# Patient Record
Sex: FEMALE | Race: BLACK OR AFRICAN AMERICAN | NOT HISPANIC OR LATINO | URBAN - METROPOLITAN AREA
[De-identification: names, ages, dates, MRNs, and addresses within clinical notes are randomized per-mention and may not be internally consistent; named-entity substitution may affect disease eponyms.]

---

## 2019-11-17 ENCOUNTER — HOSPITAL ENCOUNTER (OUTPATIENT)
Facility: HOSPITAL | Age: 24
Discharge: HOME | End: 2019-11-17
Attending: EMERGENCY MEDICINE | Admitting: INTERNAL MEDICINE
Payer: COMMERCIAL

## 2019-11-17 VITALS
OXYGEN SATURATION: 99 % | SYSTOLIC BLOOD PRESSURE: 114 MMHG | TEMPERATURE: 99 F | DIASTOLIC BLOOD PRESSURE: 60 MMHG | RESPIRATION RATE: 20 BRPM | HEART RATE: 100 BPM

## 2019-11-17 DIAGNOSIS — F10.929 ALCOHOL INTOXICATION: ICD-10-CM

## 2019-11-17 DIAGNOSIS — J45.909 ASTHMA, UNSPECIFIED ASTHMA SEVERITY, UNSPECIFIED WHETHER COMPLICATED, UNSPECIFIED WHETHER PERSISTENT: ICD-10-CM

## 2019-11-17 DIAGNOSIS — D64.9 NORMOCYTIC ANEMIA: ICD-10-CM

## 2019-11-17 DIAGNOSIS — E87.4 ACIDOSIS, METABOLIC, WITH RESPIRATORY ACIDOSIS: ICD-10-CM

## 2019-11-17 DIAGNOSIS — D50.9 IRON DEFICIENCY ANEMIA, UNSPECIFIED IRON DEFICIENCY ANEMIA TYPE: ICD-10-CM

## 2019-11-17 DIAGNOSIS — F10.920 ALCOHOLIC INTOXICATION WITHOUT COMPLICATION: ICD-10-CM

## 2019-11-17 DIAGNOSIS — R94.31 ST ELEVATION: ICD-10-CM

## 2019-11-17 DIAGNOSIS — E87.6 HYPOKALEMIA: Primary | ICD-10-CM

## 2019-11-17 PROBLEM — E87.29 RESPIRATORY ACIDOSIS: Status: ACTIVE | Noted: 2019-11-17

## 2019-11-17 LAB
ALBUMIN SERPL BCP-MCNC: 3.4 G/DL (ref 3.5–5.2)
ALLENS TEST: ABNORMAL
ALP SERPL-CCNC: 63 U/L (ref 55–135)
ALT SERPL W/O P-5'-P-CCNC: 11 U/L (ref 10–44)
AMPHET+METHAMPHET UR QL: NEGATIVE
ANION GAP SERPL CALC-SCNC: 14 MMOL/L (ref 8–16)
ANION GAP SERPL CALC-SCNC: 9 MMOL/L (ref 8–16)
AST SERPL-CCNC: 17 U/L (ref 10–40)
B-HCG UR QL: NEGATIVE
BARBITURATES UR QL SCN>200 NG/ML: NEGATIVE
BASOPHILS # BLD AUTO: 0.02 K/UL (ref 0–0.2)
BASOPHILS # BLD AUTO: 0.1 K/UL (ref 0–0.2)
BASOPHILS NFR BLD: 0.2 % (ref 0–1.9)
BASOPHILS NFR BLD: 0.8 % (ref 0–1.9)
BENZODIAZ UR QL SCN>200 NG/ML: NEGATIVE
BILIRUB SERPL-MCNC: 0.2 MG/DL (ref 0.1–1)
BILIRUB UR QL STRIP: NEGATIVE
BUN SERPL-MCNC: 3 MG/DL (ref 6–20)
BUN SERPL-MCNC: 5 MG/DL (ref 6–20)
BZE UR QL SCN: NEGATIVE
CALCIUM SERPL-MCNC: 8.3 MG/DL (ref 8.7–10.5)
CALCIUM SERPL-MCNC: 8.4 MG/DL (ref 8.7–10.5)
CANNABINOIDS UR QL SCN: NORMAL
CHLORIDE SERPL-SCNC: 109 MMOL/L (ref 95–110)
CHLORIDE SERPL-SCNC: 110 MMOL/L (ref 95–110)
CLARITY UR REFRACT.AUTO: CLEAR
CO2 SERPL-SCNC: 18 MMOL/L (ref 23–29)
CO2 SERPL-SCNC: 22 MMOL/L (ref 23–29)
COLOR UR AUTO: YELLOW
CREAT SERPL-MCNC: 0.6 MG/DL (ref 0.5–1.4)
CREAT SERPL-MCNC: 0.7 MG/DL (ref 0.5–1.4)
CREAT UR-MCNC: 126 MG/DL (ref 15–325)
CTP QC/QA: YES
DIFFERENTIAL METHOD: ABNORMAL
DIFFERENTIAL METHOD: ABNORMAL
EOSINOPHIL # BLD AUTO: 0 K/UL (ref 0–0.5)
EOSINOPHIL # BLD AUTO: 0.3 K/UL (ref 0–0.5)
EOSINOPHIL NFR BLD: 0 % (ref 0–8)
EOSINOPHIL NFR BLD: 2.3 % (ref 0–8)
ERYTHROCYTE [DISTWIDTH] IN BLOOD BY AUTOMATED COUNT: 16.9 % (ref 11.5–14.5)
ERYTHROCYTE [DISTWIDTH] IN BLOOD BY AUTOMATED COUNT: 17.2 % (ref 11.5–14.5)
EST. GFR  (AFRICAN AMERICAN): >60 ML/MIN/1.73 M^2
EST. GFR  (AFRICAN AMERICAN): >60 ML/MIN/1.73 M^2
EST. GFR  (NON AFRICAN AMERICAN): >60 ML/MIN/1.73 M^2
EST. GFR  (NON AFRICAN AMERICAN): >60 ML/MIN/1.73 M^2
ETHANOL SERPL-MCNC: 216 MG/DL
FERRITIN SERPL-MCNC: 30 NG/ML (ref 20–300)
FOLATE SERPL-MCNC: 4.8 NG/ML (ref 4–24)
GLUCOSE SERPL-MCNC: 108 MG/DL (ref 70–110)
GLUCOSE SERPL-MCNC: 166 MG/DL (ref 70–110)
GLUCOSE UR QL STRIP: NEGATIVE
HCO3 UR-SCNC: 20.7 MMOL/L (ref 24–28)
HCT VFR BLD AUTO: 31.4 % (ref 37–48.5)
HCT VFR BLD AUTO: 33.4 % (ref 37–48.5)
HGB BLD-MCNC: 9.6 G/DL (ref 12–16)
HGB BLD-MCNC: 9.9 G/DL (ref 12–16)
HGB UR QL STRIP: NEGATIVE
IMM GRANULOCYTES # BLD AUTO: 0.06 K/UL (ref 0–0.04)
IMM GRANULOCYTES # BLD AUTO: 0.27 K/UL (ref 0–0.04)
IMM GRANULOCYTES NFR BLD AUTO: 0.7 % (ref 0–0.5)
IMM GRANULOCYTES NFR BLD AUTO: 2.2 % (ref 0–0.5)
IRON SERPL-MCNC: 24 UG/DL (ref 30–160)
KETONES UR QL STRIP: NEGATIVE
LEUKOCYTE ESTERASE UR QL STRIP: NEGATIVE
LYMPHOCYTES # BLD AUTO: 1.2 K/UL (ref 1–4.8)
LYMPHOCYTES # BLD AUTO: 3.7 K/UL (ref 1–4.8)
LYMPHOCYTES NFR BLD: 13.5 % (ref 18–48)
LYMPHOCYTES NFR BLD: 29.6 % (ref 18–48)
MAGNESIUM SERPL-MCNC: 1.7 MG/DL (ref 1.6–2.6)
MCH RBC QN AUTO: 25.4 PG (ref 27–31)
MCH RBC QN AUTO: 25.5 PG (ref 27–31)
MCHC RBC AUTO-ENTMCNC: 29.6 G/DL (ref 32–36)
MCHC RBC AUTO-ENTMCNC: 30.6 G/DL (ref 32–36)
MCV RBC AUTO: 83 FL (ref 82–98)
MCV RBC AUTO: 86 FL (ref 82–98)
METHADONE UR QL SCN>300 NG/ML: NEGATIVE
MONOCYTES # BLD AUTO: 0.4 K/UL (ref 0.3–1)
MONOCYTES # BLD AUTO: 1.4 K/UL (ref 0.3–1)
MONOCYTES NFR BLD: 10.9 % (ref 4–15)
MONOCYTES NFR BLD: 4.7 % (ref 4–15)
NEUTROPHILS # BLD AUTO: 6.7 K/UL (ref 1.8–7.7)
NEUTROPHILS # BLD AUTO: 7.3 K/UL (ref 1.8–7.7)
NEUTROPHILS NFR BLD: 54.2 % (ref 38–73)
NEUTROPHILS NFR BLD: 80.9 % (ref 38–73)
NITRITE UR QL STRIP: NEGATIVE
NRBC BLD-RTO: 0 /100 WBC
NRBC BLD-RTO: 0 /100 WBC
OPIATES UR QL SCN: NEGATIVE
PCO2 BLDA: 48.1 MMHG (ref 35–45)
PCP UR QL SCN>25 NG/ML: NEGATIVE
PH SMN: 7.24 [PH] (ref 7.35–7.45)
PH UR STRIP: 6 [PH] (ref 5–8)
PHOSPHATE SERPL-MCNC: 3.7 MG/DL (ref 2.7–4.5)
PLATELET # BLD AUTO: 293 K/UL (ref 150–350)
PLATELET # BLD AUTO: 300 K/UL (ref 150–350)
PMV BLD AUTO: 11.5 FL (ref 9.2–12.9)
PMV BLD AUTO: 11.7 FL (ref 9.2–12.9)
PO2 BLDA: 41 MMHG (ref 40–60)
POC BE: -7 MMOL/L
POC SATURATED O2: 68 % (ref 95–100)
POC TCO2: 22 MMOL/L (ref 24–29)
POCT GLUCOSE: 164 MG/DL (ref 70–110)
POTASSIUM SERPL-SCNC: 2.6 MMOL/L (ref 3.5–5.1)
POTASSIUM SERPL-SCNC: 4.4 MMOL/L (ref 3.5–5.1)
PROT SERPL-MCNC: 6.7 G/DL (ref 6–8.4)
PROT UR QL STRIP: NEGATIVE
RBC # BLD AUTO: 3.77 M/UL (ref 4–5.4)
RBC # BLD AUTO: 3.89 M/UL (ref 4–5.4)
RETICS/RBC NFR AUTO: 1.5 % (ref 0.5–2.5)
SAMPLE: ABNORMAL
SATURATED IRON: 7 % (ref 20–50)
SITE: ABNORMAL
SODIUM SERPL-SCNC: 140 MMOL/L (ref 136–145)
SODIUM SERPL-SCNC: 142 MMOL/L (ref 136–145)
SP GR UR STRIP: 1.01 (ref 1–1.03)
TOTAL IRON BINDING CAPACITY: 343 UG/DL (ref 250–450)
TOXICOLOGY INFORMATION: NORMAL
TRANSFERRIN SERPL-MCNC: 232 MG/DL (ref 200–375)
TROPONIN I SERPL DL<=0.01 NG/ML-MCNC: 0.02 NG/ML (ref 0–0.03)
URN SPEC COLLECT METH UR: NORMAL
VIT B12 SERPL-MCNC: 542 PG/ML (ref 210–950)
WBC # BLD AUTO: 12.43 K/UL (ref 3.9–12.7)
WBC # BLD AUTO: 9.07 K/UL (ref 3.9–12.7)

## 2019-11-17 PROCEDURE — 96376 TX/PRO/DX INJ SAME DRUG ADON: CPT

## 2019-11-17 PROCEDURE — 25000003 PHARM REV CODE 250: Performed by: STUDENT IN AN ORGANIZED HEALTH CARE EDUCATION/TRAINING PROGRAM

## 2019-11-17 PROCEDURE — 85025 COMPLETE CBC W/AUTO DIFF WBC: CPT

## 2019-11-17 PROCEDURE — 96361 HYDRATE IV INFUSION ADD-ON: CPT

## 2019-11-17 PROCEDURE — 96366 THER/PROPH/DIAG IV INF ADDON: CPT

## 2019-11-17 PROCEDURE — 82607 VITAMIN B-12: CPT

## 2019-11-17 PROCEDURE — 96375 TX/PRO/DX INJ NEW DRUG ADDON: CPT

## 2019-11-17 PROCEDURE — 84100 ASSAY OF PHOSPHORUS: CPT

## 2019-11-17 PROCEDURE — 99285 EMERGENCY DEPT VISIT HI MDM: CPT | Mod: 25

## 2019-11-17 PROCEDURE — 99220 PR INITIAL OBSERVATION CARE,LEVL III: CPT | Mod: ,,, | Performed by: INTERNAL MEDICINE

## 2019-11-17 PROCEDURE — 82803 BLOOD GASES ANY COMBINATION: CPT

## 2019-11-17 PROCEDURE — 93010 ELECTROCARDIOGRAM REPORT: CPT | Mod: 76,,, | Performed by: INTERNAL MEDICINE

## 2019-11-17 PROCEDURE — 93010 EKG 12-LEAD: ICD-10-PCS | Mod: 76,,, | Performed by: INTERNAL MEDICINE

## 2019-11-17 PROCEDURE — 84484 ASSAY OF TROPONIN QUANT: CPT

## 2019-11-17 PROCEDURE — 80307 DRUG TEST PRSMV CHEM ANLYZR: CPT

## 2019-11-17 PROCEDURE — 83735 ASSAY OF MAGNESIUM: CPT

## 2019-11-17 PROCEDURE — 85045 AUTOMATED RETICULOCYTE COUNT: CPT

## 2019-11-17 PROCEDURE — 36415 COLL VENOUS BLD VENIPUNCTURE: CPT

## 2019-11-17 PROCEDURE — 81025 URINE PREGNANCY TEST: CPT | Performed by: EMERGENCY MEDICINE

## 2019-11-17 PROCEDURE — 83540 ASSAY OF IRON: CPT

## 2019-11-17 PROCEDURE — 99900035 HC TECH TIME PER 15 MIN (STAT)

## 2019-11-17 PROCEDURE — 63600175 PHARM REV CODE 636 W HCPCS: Performed by: EMERGENCY MEDICINE

## 2019-11-17 PROCEDURE — 82800 BLOOD PH: CPT

## 2019-11-17 PROCEDURE — 80320 DRUG SCREEN QUANTALCOHOLS: CPT

## 2019-11-17 PROCEDURE — 80053 COMPREHEN METABOLIC PANEL: CPT

## 2019-11-17 PROCEDURE — 93010 ELECTROCARDIOGRAM REPORT: CPT | Mod: ,,, | Performed by: INTERNAL MEDICINE

## 2019-11-17 PROCEDURE — P9612 CATHETERIZE FOR URINE SPEC: HCPCS

## 2019-11-17 PROCEDURE — 80048 BASIC METABOLIC PNL TOTAL CA: CPT

## 2019-11-17 PROCEDURE — 63600175 PHARM REV CODE 636 W HCPCS: Performed by: STUDENT IN AN ORGANIZED HEALTH CARE EDUCATION/TRAINING PROGRAM

## 2019-11-17 PROCEDURE — 82728 ASSAY OF FERRITIN: CPT

## 2019-11-17 PROCEDURE — G0378 HOSPITAL OBSERVATION PER HR: HCPCS

## 2019-11-17 PROCEDURE — 99220 PR INITIAL OBSERVATION CARE,LEVL III: ICD-10-PCS | Mod: ,,, | Performed by: INTERNAL MEDICINE

## 2019-11-17 PROCEDURE — 93005 ELECTROCARDIOGRAM TRACING: CPT

## 2019-11-17 PROCEDURE — 82962 GLUCOSE BLOOD TEST: CPT

## 2019-11-17 PROCEDURE — 63600175 PHARM REV CODE 636 W HCPCS

## 2019-11-17 PROCEDURE — 81003 URINALYSIS AUTO W/O SCOPE: CPT | Mod: 59

## 2019-11-17 PROCEDURE — 82746 ASSAY OF FOLIC ACID SERUM: CPT

## 2019-11-17 PROCEDURE — 96365 THER/PROPH/DIAG IV INF INIT: CPT

## 2019-11-17 PROCEDURE — 99285 PR EMERGENCY DEPT VISIT,LEVEL V: ICD-10-PCS | Mod: ,,, | Performed by: EMERGENCY MEDICINE

## 2019-11-17 PROCEDURE — 99285 EMERGENCY DEPT VISIT HI MDM: CPT | Mod: ,,, | Performed by: EMERGENCY MEDICINE

## 2019-11-17 RX ORDER — ONDANSETRON 2 MG/ML
4 INJECTION INTRAMUSCULAR; INTRAVENOUS
Status: COMPLETED | OUTPATIENT
Start: 2019-11-17 | End: 2019-11-17

## 2019-11-17 RX ORDER — SODIUM CHLORIDE 0.9 % (FLUSH) 0.9 %
10 SYRINGE (ML) INJECTION
Status: DISCONTINUED | OUTPATIENT
Start: 2019-11-17 | End: 2019-11-17 | Stop reason: HOSPADM

## 2019-11-17 RX ORDER — POTASSIUM CHLORIDE 7.45 MG/ML
10 INJECTION INTRAVENOUS
Status: DISPENSED | OUTPATIENT
Start: 2019-11-17 | End: 2019-11-17

## 2019-11-17 RX ORDER — GLUCAGON 1 MG
1 KIT INJECTION
Status: DISCONTINUED | OUTPATIENT
Start: 2019-11-17 | End: 2019-11-17 | Stop reason: HOSPADM

## 2019-11-17 RX ORDER — IBUPROFEN 200 MG
24 TABLET ORAL
Status: DISCONTINUED | OUTPATIENT
Start: 2019-11-17 | End: 2019-11-17 | Stop reason: HOSPADM

## 2019-11-17 RX ORDER — ALBUTEROL SULFATE 2.5 MG/.5ML
2.5 SOLUTION RESPIRATORY (INHALATION) EVERY 4 HOURS PRN
Status: DISCONTINUED | OUTPATIENT
Start: 2019-11-17 | End: 2019-11-17 | Stop reason: HOSPADM

## 2019-11-17 RX ORDER — IBUPROFEN 200 MG
16 TABLET ORAL
Status: DISCONTINUED | OUTPATIENT
Start: 2019-11-17 | End: 2019-11-17 | Stop reason: HOSPADM

## 2019-11-17 RX ORDER — POTASSIUM CHLORIDE 20 MEQ/1
40 TABLET, EXTENDED RELEASE ORAL ONCE
Status: COMPLETED | OUTPATIENT
Start: 2019-11-17 | End: 2019-11-17

## 2019-11-17 RX ORDER — ONDANSETRON 8 MG/1
8 TABLET, ORALLY DISINTEGRATING ORAL EVERY 8 HOURS PRN
Status: DISCONTINUED | OUTPATIENT
Start: 2019-11-17 | End: 2019-11-17 | Stop reason: HOSPADM

## 2019-11-17 RX ORDER — MAGNESIUM SULFATE HEPTAHYDRATE 40 MG/ML
2 INJECTION, SOLUTION INTRAVENOUS ONCE
Status: COMPLETED | OUTPATIENT
Start: 2019-11-17 | End: 2019-11-17

## 2019-11-17 RX ORDER — ACETAMINOPHEN 325 MG/1
650 TABLET ORAL EVERY 4 HOURS PRN
Status: DISCONTINUED | OUTPATIENT
Start: 2019-11-17 | End: 2019-11-17 | Stop reason: HOSPADM

## 2019-11-17 RX ORDER — NALOXONE HCL 0.4 MG/ML
VIAL (ML) INJECTION
Status: COMPLETED
Start: 2019-11-17 | End: 2019-11-17

## 2019-11-17 RX ORDER — ONDANSETRON 2 MG/ML
INJECTION INTRAMUSCULAR; INTRAVENOUS
Status: COMPLETED
Start: 2019-11-17 | End: 2019-11-17

## 2019-11-17 RX ADMIN — POTASSIUM CHLORIDE 40 MEQ: 1500 TABLET, EXTENDED RELEASE ORAL at 09:11

## 2019-11-17 RX ADMIN — SODIUM CHLORIDE 1000 ML: 0.9 INJECTION, SOLUTION INTRAVENOUS at 12:11

## 2019-11-17 RX ADMIN — ONDANSETRON 4 MG: 2 INJECTION INTRAMUSCULAR; INTRAVENOUS at 12:11

## 2019-11-17 RX ADMIN — POTASSIUM CHLORIDE 10 MEQ: 7.46 INJECTION, SOLUTION INTRAVENOUS at 04:11

## 2019-11-17 RX ADMIN — NALOXONE HYDROCHLORIDE: 0.4 INJECTION, SOLUTION INTRAMUSCULAR; INTRAVENOUS; SUBCUTANEOUS at 12:11

## 2019-11-17 RX ADMIN — POTASSIUM CHLORIDE 10 MEQ: 7.46 INJECTION, SOLUTION INTRAVENOUS at 03:11

## 2019-11-17 RX ADMIN — POTASSIUM CHLORIDE 40 MEQ: 1500 TABLET, EXTENDED RELEASE ORAL at 04:11

## 2019-11-17 RX ADMIN — MAGNESIUM SULFATE IN WATER 2 G: 40 INJECTION, SOLUTION INTRAVENOUS at 04:11

## 2019-11-17 RX ADMIN — POTASSIUM CHLORIDE 10 MEQ: 7.46 INJECTION, SOLUTION INTRAVENOUS at 02:11

## 2019-11-17 RX ADMIN — SODIUM CHLORIDE, SODIUM LACTATE, POTASSIUM CHLORIDE, AND CALCIUM CHLORIDE 1000 ML: .6; .31; .03; .02 INJECTION, SOLUTION INTRAVENOUS at 02:11

## 2019-11-17 RX ADMIN — ONDANSETRON 4 MG: 2 INJECTION INTRAMUSCULAR; INTRAVENOUS at 02:11

## 2019-11-17 NOTE — ED TRIAGE NOTES
Romaine Regan, a 24 y.o. female presents to the ED w/ complaint of syncope episode PTA (11/17/19 0000). Per friends (at bedside) pt was drinking all day, and had a few episodes of vomiting. Friends brought patient outside to catch some air and passed out. Unresponsive at this time. VSS. Pinpoint pupils.     Triage note:  Chief Complaint   Patient presents with    Alcohol Intoxication     someone from event called reporting she may have been slipped something and passed out. +ETOH      Review of patient's allergies indicates:  No Known Allergies  No past medical history on file.

## 2019-11-17 NOTE — CODE/ RAPID DOCUMENTATION
Rapid Response Nurse Chart Check     Chart check completed, abnormal VS noted. Bedside RN contacted, no concerns verbalized at this time, sinus tach, SBP 90, no acute distress noted at this time, pt on tele. Instructed to call [phone] for further concerns or assistance.

## 2019-11-17 NOTE — NURSING
Pt prepared for discharge, AVS reviewed with patient and mother at bedside.  Plan to return home to [place], [place] area today.  VSS, NAD.  Tele monitor removed and returned.  Peripheral IV #20 to LAC removed, dressing applied, no active bleeding noted.

## 2019-11-17 NOTE — NURSING
Patient admitted from ED for alcohol intoxication. ST elevation noted on telemetry. Rapid response nurse notified and in agreement with ST elevation. On call MD notified and STAT EKG ordered and obtained. Primary team aware and will follow-up.

## 2019-11-17 NOTE — ED PROVIDER NOTES
Encounter Date: 11/17/2019       History     Chief Complaint   Patient presents with    Alcohol Intoxication     someone from event called reporting she may have been slipped something and passed out. +ETOH      HPI     24-year-old previously healthy female presenting via EMS for acute alcoholic intoxication.  Per EMS report and friends were present at bedside, patient drank a large amount of alcohol today as they are visiting from [place].  Reportedly drank several drinks throughout the day.  Concerned with possible ingestion or possibly slipped something in her drink.  Patient arrives via EMS, obtunded, acutely intoxicated.  She is able to be aroused, appears intoxicated.  No focal neurologic deficits.  No signs of trauma, fall or injury. Reported, passing out after drinking.  She has emesis on her clothing, with several episodes of vomiting.    Review of patient's allergies indicates:  No Known Allergies  No past medical history on file.  No past surgical history on file.  No family history on file.  Social History     Tobacco Use    Smoking status: Not on file   Substance Use Topics    Alcohol use: Not on file    Drug use: Not on file     Review of Systems   Unable to perform ROS: Acuity of condition       Physical Exam     Initial Vitals [11/17/19 0026]   BP Pulse Resp Temp SpO2   120/86 64 18 -- 98 %      MAP       --         Physical Exam    Nursing note and vitals reviewed.    Gen/Constitutional:  Obtunded, lethargic, acutely intoxicated  Head: Normocephalic, Atraumatic, no lacerations  Neck: supple, no masses or LAD, no JVD  Eyes: PERRLA, conjunctiva clear, pupils 3-2 mm briskly reactive  Ears, Nose and Throat: No rhinorrhea or stridor.  Cardiac:  Regular rate, Reg Rhythm, No murmur, rubs or gallops  Pulmonary: CTA Bilat, no wheezes, rhonchi, rales.  GI: Abdomen soft, non-tender, non-distended; no rebound or guarding  : No CVA tenderness.  Musculoskeletal: Extremities warm, well perfused, no  erythema, no edema  Skin: No rashes  Neuro:  Obtunded, lethargic, acutely intoxicated  Psych: Normal affect      ED Course   Procedures  Labs Reviewed   CBC W/ AUTO DIFFERENTIAL - Abnormal; Notable for the following components:       Result Value    RBC 3.77 (*)     Hemoglobin 9.6 (*)     Hematocrit 31.4 (*)     Mean Corpuscular Hemoglobin 25.5 (*)     Mean Corpuscular Hemoglobin Conc 30.6 (*)     RDW 16.9 (*)     Immature Granulocytes 2.2 (*)     Immature Grans (Abs) 0.27 (*)     Mono # 1.4 (*)     All other components within normal limits   COMPREHENSIVE METABOLIC PANEL - Abnormal; Notable for the following components:    Potassium 2.6 (*)     CO2 18 (*)     Glucose 166 (*)     BUN, Bld 5 (*)     Calcium 8.3 (*)     Albumin 3.4 (*)     All other components within normal limits   ALCOHOL,MEDICAL (ETHANOL) - Abnormal; Notable for the following components:    Alcohol, Medical, Serum 216 (*)     All other components within normal limits   POCT GLUCOSE - Abnormal; Notable for the following components:    POCT Glucose 164 (*)     All other components within normal limits   ISTAT PROCEDURE - Abnormal; Notable for the following components:    POC PH 7.242 (*)     POC PCO2 48.1 (*)     POC HCO3 20.7 (*)     POC SATURATED O2 68 (*)     POC TCO2 22 (*)     All other components within normal limits   DRUG SCREEN PANEL, URINE EMERGENCY    Narrative:     Preferred Collection Type->Urine, Clean Catch   URINALYSIS, REFLEX TO URINE CULTURE    Narrative:     Preferred Collection Type->Urine, Clean Catch   MAGNESIUM   PHOSPHORUS   POCT URINE PREGNANCY   POCT GLUCOSE MONITORING CONTINUOUS          Imaging Results          X-Ray Chest AP Portable (Final result)  Result time 11/17/19 02:14:27    Final result by Anil Ordoñez MD (11/17/19 02:14:27)                 Impression:      No focal consolidation identified on this limited single view.      Electronically signed by: Anil Ordoñez MD  Date:    11/17/2019  Time:    02:14     "         Narrative:    EXAMINATION:  XR CHEST AP PORTABLE    CLINICAL HISTORY:  Provided history is "alcohol intoxication;  ".    TECHNIQUE:  One view of the chest.    COMPARISON:  None.    FINDINGS:  Cardiac wires and other radiodensities overlie the chest and somewhat limit evaluation of the underlying lung fields.  Cardiac silhouette is not enlarged.  There are mild perihilar interstitial opacities which may be exaggerated by supine technique.  No confluent area of consolidation.  No sizable pleural effusion.  No pneumothorax.                                 Medical Decision Making:   History:   I obtained history from: EMS provider.  Old Medical Records: I decided to obtain old medical records.  Initial Assessment:   24-year-old previously healthy female presenting with acute alcoholic intoxication.  Differential Diagnosis:   Differential diagnosis includes was not limited to:  Alcohol intoxication, alcohol use, hypoglycemia, trauma, drug abuse, overdose, encephalopathy  Independently Interpreted Test(s):   I have ordered and independently interpreted X-rays - see prior notes.  I have ordered and independently interpreted EKG Reading(s) - see prior notes  Clinical Tests:   Lab Tests: Ordered and Reviewed  Radiological Study: Ordered and Reviewed  Medical Tests: Ordered and Reviewed    Emergent evaluation of patient presenting via EMS for acute alcohol intoxication.  She was found down, vomiting, after passing with drinking several alcoholic drinks today.  Her friends were concerned that she may have been slipped something in her drink.  She is currently afebrile, vital signs are stable. No hypoxemia, no hypotension she is protecting her airway.  Physical exam findings unremarkable with any acute traumatic injuries.  She has of emesis and vomiting on her clothes.  No loss of urine, no seizure reported history.  There is no evidence of trauma on the head, neck, back, spine or abdomen.  No bony deformities of her " extremities. Given acute intoxication with obtundation, patient was placed on oxygen, cardiac and telemetry monitoring including pulse oximetry.  IV line was placed, labs were drawn, UA obtained a pregnancy test obtained, ECG obtained with no signs of ischemia or STEMI on my read.  IV fluids administered.  Blood sugar stable without hypoglycemia.  Blood alcohol over 200, labs with significant hypokalemia with potassium of 2.6 venous blood gas elevated pCO2 to 48, pH to 7.2.  Suspect likely respiratory and metabolic acidosis.  Discussed case with hospital medicine team, will admit for observation, given acute intoxication with metabolic derangements.  Patient family agreeable to admission plan.    Complexity:  High - level 5                               Clinical Impression:       ICD-10-CM ICD-9-CM   1. Hypokalemia E87.6 276.8   2. Alcohol intoxication F10.929 305.00   3. Acidosis, metabolic, with respiratory acidosis E87.4 276.4         Disposition:   Disposition: Discharged  Condition: Stable      Toby Dietz DO  Dept of Emergency Medicine   [facility]  Spectralink: [id number]                 Toby Dietz DO  11/17/19 0348

## 2019-11-17 NOTE — LETTER
November 17, 2019         [address]  [facility] [address]  Phone: [phone]       Patient: Romaine Regan   YOB: 1995  Date of Visit: 11/17/2019    To Whom It May Concern:    Sea Regan  was at [facility] on 11/17/2019. She may return to work/school on 11/22/19 with no restrictions. If you have any questions or concerns, or if I can be of further assistance, please do not hesitate to contact me.    Sincerely,    Tahira Gordon MD

## 2019-11-17 NOTE — DISCHARGE SUMMARY
[facility] Hospital Medicine  Discharge Summary      Patient Name: Romaine Regan  MRN: [id number]  Admission Date: 11/17/2019  Hospital Length of Stay: 0 days  Discharge Date and Time:  11/17/2019 2:15 PM  Attending Physician: Elizabeth Montelongo MD   Discharging Provider: Tahira Gordon MD  Primary Care Provider: No primary care provider on file.  Hospital Medicine Team[place] [facility] Tahira Gordon MD    HPI:   Ms. Romaine Regan is a 24 year old female with asthma who was brought to the ED for alcohol intoxication on 11/17 and admitted to hospital medicine for intoxication with electrolyte derangements.     Ms. Regan is a healthy young female who was visiting from [place] with friends for a bachelorette party. She was out drinking with her friends and began to be more intoxicated, vomited, and fell asleep. Her friends then called EMS. She was given IV fluids and IV potassium after her serum potassium was found to be 2.6. A VBG was checked and she had pH of 7.24 with pCO2 of 48. She was not hypoxic or in any respiratory distress.     Ms. Regan reports that she is unsure of how much alcohol she drank last night. She occasionally smokes cigarettes and smokes marijuana. She denies any other drug use. She felt well prior to last night.         * No surgery found *      Hospital Course:   Patient felt better and was more awake the following day of admission. Her electrolytes returned to within normals after being given IVF and electrolyte replacement. She was able to eat without nausea or vomiting. Iron studies showed low iron, likely due to iron deficiency anemia. Encouraged patient to follow up with her PCP in [place] (patient resides there) within a week for hospital discharge follow up and anemia management.      Consults:     No new Assessment & Plan notes have been filed under this hospital service since the last note was generated.  Service: Hospital Medicine    Final Active Diagnoses:    Diagnosis Date  Noted POA    PRINCIPAL PROBLEM:  Alcohol intoxication [F10.929] 11/17/2019 Yes    Asthma [J45.909] 11/17/2019 Yes    Hypokalemia [E87.6] 11/17/2019 Yes    Acidosis, metabolic, with respiratory acidosis [E87.4] 11/17/2019 Yes    Normocytic anemia [D64.9] 11/17/2019 Yes    Iron deficiency anemia [D50.9]  Yes      Problems Resolved During this Admission:       Discharged Condition: good    Disposition: Home or Self Care    Follow Up:    Patient Instructions:   No discharge procedures on file.      Pending Diagnostic Studies:     Procedure Component Value Units Date/Time    Basic metabolic panel [[id number]]     Order Status:  Sent Lab Status:  No result     Specimen:  Blood          Medications:  Reconciled Home Medications:      Medication List      You have not been prescribed any medications.     Patient will continue inhaler for asthma upon discharge    Indwelling Lines/Drains at time of discharge:   Lines/Drains/Airways     None                 Time spent on the discharge of patient: 35 minutes  Patient was seen and examined on the date of discharge and determined to be suitable for discharge.         Tahira Gordon MD PGY2  Department of Hospital Medicine  [facility]

## 2019-11-17 NOTE — SUBJECTIVE & OBJECTIVE
Past Medical History:   Diagnosis Date    Asthma        No past surgical history on file.    Review of patient's allergies indicates:  No Known Allergies    No current facility-administered medications on file prior to encounter.      No current outpatient medications on file prior to encounter.     Family History     Problem Relation (Age of Onset)    No Known Problems Mother, Father, Brother        Tobacco Use    Smoking status: Current Some Day Smoker     Packs/day: 0.25   Substance and Sexual Activity    Alcohol use: Yes    Drug use: Yes     Types: Marijuana    Sexual activity: Not on file     Review of Systems   Constitutional: Negative for chills and fever.   HENT: Negative for rhinorrhea and sore throat.    Eyes: Negative for pain and redness.   Respiratory: Negative for cough and shortness of breath.    Cardiovascular: Negative for chest pain, palpitations and leg swelling.   Gastrointestinal: Positive for nausea and vomiting. Negative for abdominal pain, constipation and diarrhea.   Endocrine: Negative for polydipsia and polyuria.   Genitourinary: Negative for dysuria and hematuria.   Musculoskeletal: Negative for back pain and neck pain.   Skin: Negative for color change and rash.   Neurological: Negative for light-headedness and headaches.   Hematological: Negative for adenopathy. Does not bruise/bleed easily.     Objective:     Vital Signs (Most Recent):  Pulse: 93 (11/17/19 0341)  Resp: 13 (11/17/19 0241)  BP: 112/78 (11/17/19 0341)  SpO2: 100 % (11/17/19 0341) Vital Signs (24h Range):  Pulse:  [58-93] 93  Resp:  [13-19] 13  SpO2:  [98 %-100 %] 100 %  BP: (109-131)/(64-86) 112/78        There is no height or weight on file to calculate BMI.    Physical Exam   Constitutional: She is oriented to person, place, and time. She appears well-developed and well-nourished. No distress.   HENT:   Head: Normocephalic and atraumatic.   Mouth/Throat: Oropharynx is clear and moist.   Eyes: Conjunctivae and EOM  are normal. No scleral icterus.   Neck: Normal range of motion. Neck supple.   Cardiovascular: Normal rate, regular rhythm and normal heart sounds. Exam reveals no gallop and no friction rub.   No murmur heard.  Pulmonary/Chest: Effort normal and breath sounds normal. No respiratory distress. She has no wheezes. She has no rales.   Abdominal: Soft. Bowel sounds are normal. She exhibits no distension. There is no tenderness. There is no guarding.   Musculoskeletal: Normal range of motion. She exhibits no edema or tenderness.   Neurological: She is alert and oriented to person, place, and time.   Somnolent but easily arousable to voice. Slurred speech.    Skin: Skin is warm and dry. No rash noted. She is not diaphoretic.   Psychiatric: She has a normal mood and affect. Her behavior is normal.         CRANIAL NERVES     CN III, IV, VI   Extraocular motions are normal.        Significant Labs:   CBC:   Recent Labs   Lab 11/17/19  0100   WBC 12.43   HGB 9.6*   HCT 31.4*        CMP:   Recent Labs   Lab 11/17/19  0100      K 2.6*      CO2 18*   *   BUN 5*   CREATININE 0.7   CALCIUM 8.3*   PROT 6.7   ALBUMIN 3.4*   BILITOT 0.2   ALKPHOS 63   AST 17   ALT 11   ANIONGAP 14   EGFRNONAA >60.0       Significant Imaging: I have reviewed all pertinent imaging results/findings within the past 24 hours.

## 2019-11-17 NOTE — H&P
[facility] Hospital Medicine  History & Physical    Patient Name: Romaine Regan  MRN: [id number]  Admission Date: 11/17/2019  Attending Physician: Elizabeth Montelongo MD   Primary Care Provider: No primary care provider on file.    Hospital Medicine Team: [facility] 3 Evelyn Hobson DO     Patient information was obtained from patient, caregiver / friend, past medical records and ER records.     Subjective:     Principal Problem:Alcohol intoxication    Chief Complaint:   Chief Complaint   Patient presents with    Alcohol Intoxication     someone from event called reporting she may have been slipped something and passed out. +ETOH         HPI: Ms. Romaine Regan is a 24 year old female with asthma who was brought to the ED for alcohol intoxication on 11/17 and admitted to hospital medicine for intoxication with electrolyte derangements.     Ms. Regan is a healthy young female who was visiting from [place] with friends [place] a [place] party. She was out drinking with her friends and began to be more intoxicated, vomited, and fell asleep. Her friends then called EMS. She was given IV fluids and IV potassium after her serum potassium was found to be 2.6. A VBG was checked and she had pH of 7.24 with pCO2 of 48. She was not hypoxic or in any respiratory distress.     Ms. Regan reports that she is unsure of how much alcohol she drank last night. She occasionally smokes cigarettes and smokes marijuana. She denies any other drug use. She felt well prior to last night.         Past Medical History:   Diagnosis Date    Asthma        No past surgical history on file.    Review of patient's allergies indicates:  No Known Allergies    No current facility-administered medications on file prior to encounter.      No current outpatient medications on file prior to encounter.     Family History     Problem Relation (Age of Onset)    No Known Problems Mother, Father, Brother        Tobacco Use     Smoking status: Current Some Day Smoker     Packs/day: 0.25   Substance and Sexual Activity    Alcohol use: Yes    Drug use: Yes     Types: Marijuana    Sexual activity: Not on file     Review of Systems   Constitutional: Negative for chills and fever.   HENT: Negative for rhinorrhea and sore throat.    Eyes: Negative for pain and redness.   Respiratory: Negative for cough and shortness of breath.    Cardiovascular: Negative for chest pain, palpitations and leg swelling.   Gastrointestinal: Positive for nausea and vomiting. Negative for abdominal pain, constipation and diarrhea.   Endocrine: Negative for polydipsia and polyuria.   Genitourinary: Negative for dysuria and hematuria.   Musculoskeletal: Negative for back pain and neck pain.   Skin: Negative for color change and rash.   Neurological: Negative for light-headedness and headaches.   Hematological: Negative for adenopathy. Does not bruise/bleed easily.     Objective:     Vital Signs (Most Recent):  Pulse: 93 (11/17/19 0341)  Resp: 13 (11/17/19 0241)  BP: 112/78 (11/17/19 0341)  SpO2: 100 % (11/17/19 0341) Vital Signs (24h Range):  Pulse:  [58-93] 93  Resp:  [13-19] 13  SpO2:  [98 %-100 %] 100 %  BP: (109-131)/(64-86) 112/78        There is no height or weight on file to calculate BMI.    Physical Exam   Constitutional: She is oriented to person, place, and time. She appears well-developed and well-nourished. No distress.   HENT:   Head: Normocephalic and atraumatic.   Mouth/Throat: Oropharynx is clear and moist.   Eyes: Conjunctivae and EOM are normal. No scleral icterus.   Neck: Normal range of motion. Neck supple.   Cardiovascular: Normal rate, regular rhythm and normal heart sounds. Exam reveals no gallop and no friction rub.   No murmur heard.  Pulmonary/Chest: Effort normal and breath sounds normal. No respiratory distress. She has no wheezes. She has no rales.   Abdominal: Soft. Bowel sounds are normal. She exhibits no distension. There is no  tenderness. There is no guarding.   Musculoskeletal: Normal range of motion. She exhibits no edema or tenderness.   Neurological: She is alert and oriented to person, place, and time.   Somnolent but easily arousable to voice. Slurred speech.    Skin: Skin is warm and dry. No rash noted. She is not diaphoretic.   Psychiatric: She has a normal mood and affect. Her behavior is normal.         CRANIAL NERVES     CN III, IV, VI   Extraocular motions are normal.        Significant Labs:   CBC:   Recent Labs   Lab 11/17/19  0100   WBC 12.43   HGB 9.6*   HCT 31.4*        CMP:   Recent Labs   Lab 11/17/19  0100      K 2.6*      CO2 18*   *   BUN 5*   CREATININE 0.7   CALCIUM 8.3*   PROT 6.7   ALBUMIN 3.4*   BILITOT 0.2   ALKPHOS 63   AST 17   ALT 11   ANIONGAP 14   EGFRNONAA >60.0       Significant Imaging: I have reviewed all pertinent imaging results/findings within the past 24 hours.    Assessment/Plan:     * Alcohol intoxication  Unsure of how much she drank last night. Ethanol level 216 at 2am.     - IVF given  - supportive care  - prn nausea medication      Hypokalemia  - replete prn with IV and oral potassium  - magnesium checked  - q8h BMP      Acidosis, metabolic, with respiratory acidosis  Combined metabolic acidosis with high anion gap (secondary to ethanol) with respiratory acidosis. VBG checked, with pH of 7.24 and pCO2 of 48.  Patient is not hypoxic, breathing comfortably, GCS 14.     - continue to monitor, expect this was secondary to sedation which is slowly resolving      Normocytic anemia  Hgb 9.6 on admission, no baseline labs    - ordered iron studies, folate, B12      Asthma  Patient uses an inhaler twice daily, but is unsure which inhaler    - will need to check which inhaler she uses in the morning (parents should be arriving in the morning)        VTE Risk Mitigation (From admission, onward)         Ordered     IP VTE LOW RISK PATIENT  Once      11/17/19 0357     Place  sequential compression device  Until discontinued      11/17/19 0357                   Evelyn Hobson DO  Department of Hospital Medicine   [facility]

## 2019-11-17 NOTE — HOSPITAL COURSE
Patient felt better and was more awake the following day of admission. Her electrolytes returned to within normals after being given IVF and electrolyte replacement. She was able to eat without nausea or vomiting. Iron studies showed low iron, likely due to iron deficiency anemia. Encouraged patient to follow up with her PCP in [place] (patient resides there) within a week for hospital discharge follow up and anemia management.

## 2019-11-17 NOTE — HPI
Ms. Romaine Regan is a 24 year old female with asthma who was brought to the ED for alcohol intoxication on 11/17 and admitted to hospital medicine for intoxication with electrolyte derangements.     Ms. Regan is a healthy young female who was visiting from [place] with friends for a bachelorette party. She was out drinking with her friends and began to be more intoxicated, vomited, and fell asleep. Her friends then called EMS. She was given IV fluids and IV potassium after her serum potassium was found to be 2.6. A VBG was checked and she had pH of 7.24 with pCO2 of 48. She was not hypoxic or in any respiratory distress.     Ms. Regan reports that she is unsure of how much alcohol she drank last night. She occasionally smokes cigarettes and smokes marijuana. She denies any other drug use. She felt well prior to last night.